# Patient Record
Sex: MALE | Race: WHITE
[De-identification: names, ages, dates, MRNs, and addresses within clinical notes are randomized per-mention and may not be internally consistent; named-entity substitution may affect disease eponyms.]

---

## 2019-09-05 ENCOUNTER — HOSPITAL ENCOUNTER (EMERGENCY)
Dept: HOSPITAL 25 - UCCORT | Age: 39
Discharge: HOME | End: 2019-09-05
Payer: COMMERCIAL

## 2019-09-05 VITALS — DIASTOLIC BLOOD PRESSURE: 58 MMHG | SYSTOLIC BLOOD PRESSURE: 106 MMHG

## 2019-09-05 DIAGNOSIS — J45.909: ICD-10-CM

## 2019-09-05 DIAGNOSIS — J18.9: Primary | ICD-10-CM

## 2019-09-05 PROCEDURE — 99212 OFFICE O/P EST SF 10 MIN: CPT

## 2019-09-05 PROCEDURE — G0463 HOSPITAL OUTPT CLINIC VISIT: HCPCS

## 2019-09-05 NOTE — UC
Respiratory Complaint HPI





- HPI Summary


HPI Summary: 





Pt presents with c/o of cough, nasal and chest congestion that he has had for 

20 days.  Pt was prescribed amox 500 mg PO Q12h and has taken 9 days worth.  Pt 

states his cough, malaise, and congestion have not improved since beginning 

antibiotics.  Pt has hx of asthma, has been using his rescue inhaler with no 

improvement, and has a nebulizer but no medication.  





- History of Current Complaint


Chief Complaint: UCRespiratory


Stated Complaint: COUGH


Time Seen by Provider: 09/05/19 11:34


Hx Obtained From: Patient


Onset/Duration: Gradual Onset, Lasting Weeks, Still Present, Worse Since - onset


Timing: Constant


Severity Initially: Mild


Severity Currently: Moderate


Pain Intensity: 0


Character: Cough: Nonproductive


Aggravating Factors: Deep Breaths, Recumbent Position


Alleviating Factors: Nothing


Associated Signs And Symptoms: Positive: Wheezing, URI, Nasal Congestion


Related History: Seasonal Allergies





- Risk Factors


Pulmonary Embolism Risk Factors: Negative


Cardiac Risk Factors: Negative


Pseudomonas Risk Factors: Chronic Lung Disease


Tuberculosis Risk Factors: Negative





- Allergies/Home Medications


Allergies/Adverse Reactions: 


 Allergies











Allergy/AdvReac Type Severity Reaction Status Date / Time


 


prednisone Allergy  Hallucinati Verified 09/05/19 11:07





   ons  


 


MAX AIR Allergy  Hallucinati Uncoded 09/05/19 11:07





   ons  











Home Medications: 


 Home Medications





Albuterol HFA INHALER* [Ventolin HFA Inhaler*] 2 puff INH Q4H PRN 09/05/19 [

History Confirmed 09/05/19]


Tiotropium CAP.INH (NF) [Spiriva CAP.INH*] 1 cap.inh INH DAILY 09/05/19 [

History Confirmed 09/05/19]


traZODone TAB* [Desyrel TAB*] 100 mg PO BEDTIME 09/05/19 [History Confirmed 09/ 05/19]











PMH/Surg Hx/FS Hx/Imm Hx


Previously Healthy: Yes


Respiratory History: Asthma





- Surgical History


Surgical History: Yes


Surgery Procedure, Year, and Place: 3 SURGERIES ON NOSE FOR BREATHING





- Family History


Known Family History: Positive: Cardiac Disease





- Social History


Occupation: Employed Full-time


Lives: With Family


Alcohol Use: Rare


Substance Use Type: None


Smoking Status (MU): Never Smoked Tobacco


Have You Smoked in the Last Year: No





Review of Systems


All Other Systems Reviewed And Are Negative: Yes


Constitutional: Positive: Chills, Fatigue


Skin: Positive: Negative


Eyes: Positive: Negative


ENT: Positive: Sinus Congestion


Respiratory: Positive: Cough


Cardiovascular: Positive: Negative


Gastrointestinal: Positive: Negative


Genitourinary: Positive: Negative


Motor: Positive: Negative


Neurovascular: Positive: Negative


Musculoskeletal: Positive: Negative


Neurological: Positive: Negative


Psychological: Positive: Negative


Is Patient Immunocompromised?: No





Physical Exam


Triage Information Reviewed: Yes


Appearance: Ill-Appearing


Vital Signs: 


 Initial Vital Signs











Temp  99.4 F   09/05/19 11:11


 


Pulse  54   09/05/19 11:11


 


Resp  17   09/05/19 11:11


 


BP  106/58   09/05/19 11:11


 


Pulse Ox  100   09/05/19 11:11











Vital Signs Reviewed: Yes


Eye Exam: Normal


ENT: Positive: Nasal congestion


Dental Exam: Normal


Neck exam: Normal


Respiratory: Positive: Wheezing - left lower


Cardiovascular Exam: Normal


Musculoskeletal Exam: Normal


Neurological Exam: Normal


Psychological Exam: Normal


Skin Exam: Normal





Respiratory Course/Dx





- Differential Dx/Diagnosis


Differential Diagnosis/HQI/PQRI: Bronchitis, Exacerbation Of COPD


Provider Diagnosis: 


 Pneumonia








Discharge ED





- Sign-Out/Discharge


Documenting (check all that apply): Patient Departure


All imaging exams completed and their final reports reviewed: No Studies





- Discharge Plan


Condition: Stable


Disposition: HOME


Prescriptions: 


Albuterol 2.5MG/3ML (0.083%)* [Ventolin 2.5 MG/3 ML NEB.SOL*] 2.5 mg INH Q4H 

PRN #1 neb.sol


 PRN Reason: Sob/Wheezing


DOXYcycline CAP(*) [DOXYcycline 100MG CAP(*)] 100 mg PO Q12H #14 cap


Fexofenadine/Pseudoephedrine [Allegra-D 24 Hour Tablet] 1 each PO DAILY #14 

tab.er.24h


Patient Education Materials:  Acute Bronchitis (ED)


Referrals: 


Senthil Abel MD [Primary Care Provider] - If Needed


Additional Instructions: 


Please follow up with your PCP and pulmonologist as needed. 





- Billing Disposition and Condition


Condition: STABLE


Disposition: Home

## 2020-01-21 ENCOUNTER — HOSPITAL ENCOUNTER (EMERGENCY)
Dept: HOSPITAL 25 - UCCORT | Age: 40
Discharge: HOME | End: 2020-01-21
Payer: COMMERCIAL

## 2020-01-21 VITALS — DIASTOLIC BLOOD PRESSURE: 69 MMHG | SYSTOLIC BLOOD PRESSURE: 116 MMHG

## 2020-01-21 DIAGNOSIS — Z91.09: ICD-10-CM
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
## 2020-01-21 DIAGNOSIS — Z88.8: ICD-10-CM

## 2020-01-21 DIAGNOSIS — J45.909: ICD-10-CM

## 2020-01-21 DIAGNOSIS — S50.02XA: Primary | ICD-10-CM
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
## 2020-01-21 PROCEDURE — 99211 OFF/OP EST MAY X REQ PHY/QHP: CPT

## 2020-01-21 PROCEDURE — G0463 HOSPITAL OUTPT CLINIC VISIT: HCPCS

## 2020-01-21 NOTE — UC
Elbow Pain





- HPI Summary


HPI Summary: 





left elbow pain x 1 days


s/p fall this morning at his place of work 


injury to left elbow , lower back 


lower back is ok now, main concern is his left elbow


pain is 6 out of 10 , worse with movement, better with ice








- History of Current Complaint


Chief Complaint: UCUpperExtremity


Stated Complaint: BILAT ARM/GLUTE PAIN


Time Seen by Provider: 01/21/20 10:01


Hx Obtained From: Patient


Onset/Duration: Days - 1


Severity Initially: Moderate


Severity Currently: Moderate


Pain Intensity: 6


Location Of Pain: Is Discrete @ - left elbow


Character: Aching


Aggravating Factor(s): Movement


Alleviating Factor(s): Rest, Ice


Associated Signs And Symptoms: Positive: Weakness.  Negative: Swelling, Redness

, Bruising, Fever, Numbness/Tingling





- Allergies/Home Medications


Allergies/Adverse Reactions: 


 Allergies











Allergy/AdvReac Type Severity Reaction Status Date / Time


 


prednisone Allergy  Hallucinati Verified 01/21/20 09:57





   ons  


 


MAX AIR Allergy  Hallucinati Uncoded 01/21/20 09:57





   ons  











Home Medications: 


 Home Medications





Zolpidem Tartrate [Ambien] 5 mg PO BEDTIME 01/21/20 [History Confirmed 01/21/20]











PMH/Surg Hx/FS Hx/Imm Hx





- Additional Past Medical History


Additional PMH: 





sleep apnea 


Respiratory History: Asthma





- Surgical History


Surgical History: Yes


Surgery Procedure, Year, and Place: 3 SURGERIES ON NOSE FOR BREATHING





- Family History


Known Family History: Positive: Cardiac Disease





- Social History


Alcohol Use: Occasionally


Substance Use Type: None


Smoking Status (MU): Never Smoked Tobacco


Have You Smoked in the Last Year: No





Review of Systems


All Other Systems Reviewed And Are Negative: Yes


Constitutional: Positive: Negative


Skin: Positive: Negative


Eyes: Positive: Negative


ENT: Positive: Negative


Respiratory: Positive: Negative


Cardiovascular: Positive: Negative


Gastrointestinal: Positive: Negative


Is Patient Immunocompromised?: No





Physical Exam


Triage Information Reviewed: Yes


Appearance: Well-Appearing, No Pain Distress, Well-Nourished


Vital Signs: 


 Initial Vital Signs











Temp  99.2 F   01/21/20 09:53


 


Pulse  67   01/21/20 09:53


 


Resp  18   01/21/20 09:53


 


BP  116/69   01/21/20 09:53


 


Pulse Ox  99   01/21/20 09:53











Vital Signs Reviewed: Yes


Eye Exam: Normal


Eyes: Positive: Conjunctiva Clear


ENT: Positive: Normal ENT inspection, Hearing grossly normal, Pharynx normal


Neck: Positive: Supple, Nontender, No Lymphadenopathy


Respiratory: Positive: Chest non-tender, Lungs clear, Normal breath sounds


Cardiovascular: Positive: RRR, No Murmur, Pulses Normal


Abdominal Exam: Normal


Abdomen Description: Positive: Nontender, Soft


Bowel Sounds: Positive: Present


Neurological: Positive: Other: - left elbow: no swelling, no effusion, diffuse 

tenderness, good ROM on Flexion and extension





Diagnostics





- Radiology


  ** No standard instances


Radiology Interpretation Completed By: Radiologist


Summary of Radiographic Findings: xray report left elbow :  IMPRESSION: NO 

ACUTE OSSEOUS INJURY. IF SYMPTOMS PERSIST, RECOMMEND REPEAT IMAGING.





Elbow Pain Course/Dx





- Differential Dx/Diagnosis


Provider Diagnosis: 


 Contusion of left elbow








Discharge ED





- Sign-Out/Discharge


Documenting (check all that apply): Patient Departure, Post-Discharge Follow Up


All imaging exams completed and their final reports reviewed: Yes





- Discharge Plan


Condition: Stable


Disposition: HOME


Patient Education Materials:  Contusion in Adults (ED)


Forms:  *Work Release


Referrals: 


Gissel Jimenez PA [Primary Care Provider] - 7 Days


Additional Instructions: 


normal xray of left elbow 


contusion of arms/ elbow/ lower back 


cont. with rest, ice, take Ibuprofen as needed for pain / inflammation 


follow up in 7 to 10 days if not better





- Billing Disposition and Condition


Condition: STABLE


Disposition: Home